# Patient Record
Sex: MALE
[De-identification: names, ages, dates, MRNs, and addresses within clinical notes are randomized per-mention and may not be internally consistent; named-entity substitution may affect disease eponyms.]

---

## 2017-01-18 ENCOUNTER — BH HISTORICAL (OUTPATIENT)
Dept: OTHER | Age: 52
End: 2017-01-18

## 2017-01-21 ENCOUNTER — BH HISTORICAL (OUTPATIENT)
Dept: OTHER | Age: 52
End: 2017-01-21

## 2021-12-09 ENCOUNTER — OFFICE VISIT (OUTPATIENT)
Dept: SURGERY | Facility: CLINIC | Age: 56
End: 2021-12-09
Payer: COMMERCIAL

## 2021-12-09 DIAGNOSIS — R22.1 NECK MASS: Primary | ICD-10-CM

## 2021-12-09 NOTE — H&P
Chief complaint: Patient presents with:  Lump: Mass on posterior neck which has been present for a few months. 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  It is normocephalic, atraumatic. Eyes: Anicteric   Neck/Thyroid: neck is supple, there is a firm, regular slightly enlarged palpable lymph node in the right posterior cervical region, it is nontender, no thyromegaly, no JVD.   Node exam: He has no lymphadenopath

## 2021-12-15 ENCOUNTER — TELEPHONE (OUTPATIENT)
Dept: SURGERY | Facility: CLINIC | Age: 56
End: 2021-12-15

## 2022-01-10 ENCOUNTER — TELEPHONE (OUTPATIENT)
Dept: SURGERY | Facility: CLINIC | Age: 57
End: 2022-01-10